# Patient Record
Sex: FEMALE | URBAN - METROPOLITAN AREA
[De-identification: names, ages, dates, MRNs, and addresses within clinical notes are randomized per-mention and may not be internally consistent; named-entity substitution may affect disease eponyms.]

---

## 2024-02-13 ENCOUNTER — OV CON (OUTPATIENT)
Dept: URBAN - METROPOLITAN AREA CLINIC 113 | Facility: CLINIC | Age: 29
End: 2024-02-13
Payer: COMMERCIAL

## 2024-02-13 ENCOUNTER — LAB (OUTPATIENT)
Dept: URBAN - METROPOLITAN AREA CLINIC 113 | Facility: CLINIC | Age: 29
End: 2024-02-13

## 2024-02-13 VITALS
WEIGHT: 160 LBS | SYSTOLIC BLOOD PRESSURE: 142 MMHG | HEART RATE: 108 BPM | DIASTOLIC BLOOD PRESSURE: 78 MMHG | TEMPERATURE: 97.8 F | BODY MASS INDEX: 26.66 KG/M2 | RESPIRATION RATE: 14 BRPM | HEIGHT: 65 IN

## 2024-02-13 DIAGNOSIS — R13.19 ESOPHAGEAL DYSPHAGIA: ICD-10-CM

## 2024-02-13 DIAGNOSIS — Z83.719 FAMILY HISTORY OF COLONIC POLYPS: ICD-10-CM

## 2024-02-13 DIAGNOSIS — R93.3 ABNORMAL BARIUM SWALLOW: ICD-10-CM

## 2024-02-13 DIAGNOSIS — Z80.0 FAMILY HISTORY OF COLON CANCER IN FATHER: ICD-10-CM

## 2024-02-13 PROCEDURE — 99204 OFFICE O/P NEW MOD 45 MIN: CPT | Performed by: INTERNAL MEDICINE

## 2024-02-13 RX ORDER — VALACYCLOVIR 1 G/1
1 TABLET TABLET, FILM COATED ORAL ONCE A DAY
Status: ACTIVE | COMMUNITY

## 2024-02-13 RX ORDER — GABAPENTIN 300 MG/1
1 CAPSULE CAPSULE ORAL ONCE A DAY
Status: ACTIVE | COMMUNITY

## 2024-02-13 RX ORDER — DEXTROAMPHETAMINE SULFATE, DEXTROAMPHETAMINE SACCHARATE, AMPHETAMINE ASPARTATE MONOHYDRATE, AND AMPHETAMINE SULFATE 9.375; 9.375; 9.375; 9.375 MG/1; MG/1; MG/1; MG/1
1 CAPSULE IN THE MORNING CAPSULE, EXTENDED RELEASE ORAL ONCE A DAY
Status: ACTIVE | COMMUNITY

## 2024-02-13 NOTE — HPI-TODAY'S VISIT:
Patient is a very pleasant 28-year-old female referred for gastrointestinal issues. Referring records were reviewed.  Laboratory testing from January 2024 revealed a normal CMP, normal TSH, normal T4, normal CBC, normal folate, normal selenium level, low vitamin D at 18.  Laboratory testing from November 2023 did reveal a slight elevated calcium at 10.3.  CALVIN was positive.  Titer was 1-80. Barium swallow performed November 20, 2023 revealed small mucosal triangular defect at the level of the vocal cord. The patient states for about 8 years she has had intermittent dysphagia and she points to the neck region primarily to solids.  Has been nonprogressive and intermittent.  She has never had an upper endoscopy. There is a strong family history of colon cancer and that her father was diagnosed with colon cancer in his mid 40s.  Her brother who is age 32 recently underwent colonoscopy and was found to have "benign" polyps.

## 2024-03-06 ENCOUNTER — COLON (OUTPATIENT)
Dept: URBAN - METROPOLITAN AREA SURGERY CENTER 25 | Facility: SURGERY CENTER | Age: 29
End: 2024-03-06

## 2024-03-27 ENCOUNTER — EGD W/ DIL (OUTPATIENT)
Dept: URBAN - METROPOLITAN AREA SURGERY CENTER 25 | Facility: SURGERY CENTER | Age: 29
End: 2024-03-27

## 2024-05-09 ENCOUNTER — OFFICE VISIT (OUTPATIENT)
Dept: URBAN - METROPOLITAN AREA SURGERY CENTER 25 | Facility: SURGERY CENTER | Age: 29
End: 2024-05-09

## 2024-06-05 ENCOUNTER — OFFICE VISIT (OUTPATIENT)
Dept: URBAN - METROPOLITAN AREA SURGERY CENTER 25 | Facility: SURGERY CENTER | Age: 29
End: 2024-06-05